# Patient Record
Sex: FEMALE | Race: WHITE
[De-identification: names, ages, dates, MRNs, and addresses within clinical notes are randomized per-mention and may not be internally consistent; named-entity substitution may affect disease eponyms.]

---

## 2019-08-27 ENCOUNTER — HOSPITAL ENCOUNTER (EMERGENCY)
Dept: HOSPITAL 4 - SED | Age: 34
Discharge: HOME | End: 2019-08-27
Payer: OTHER GOVERNMENT

## 2019-08-27 VITALS — SYSTOLIC BLOOD PRESSURE: 120 MMHG

## 2019-08-27 VITALS — BODY MASS INDEX: 23.9 KG/M2 | WEIGHT: 140 LBS | HEIGHT: 64 IN

## 2019-08-27 DIAGNOSIS — R06.00: Primary | ICD-10-CM

## 2019-08-27 DIAGNOSIS — F17.290: ICD-10-CM

## 2019-08-27 DIAGNOSIS — F17.210: ICD-10-CM

## 2019-08-27 PROCEDURE — 99283 EMERGENCY DEPT VISIT LOW MDM: CPT

## 2019-08-27 PROCEDURE — 71045 X-RAY EXAM CHEST 1 VIEW: CPT

## 2019-08-27 PROCEDURE — 94640 AIRWAY INHALATION TREATMENT: CPT

## 2019-08-27 PROCEDURE — 81025 URINE PREGNANCY TEST: CPT

## 2019-08-27 NOTE — NUR
Patient given written and verbal discharge instructions and verbalizes 
understanding.  ER MD discussed with patient the results and treatment 
provided. Patient in stable condition. ID arm band removed. Rx of Prednisone 
and albuterol given. Patient educated on pain management and to follow up with 
PMD. Pain Scale 0/10.Opportunity for questions provided and answered. 
Medication side effect fact sheet provided.

## 2019-08-27 NOTE — NUR
pt arrives with c/o of SOB at rest. Pt reports smoking a pack a day for 10 
years. Lungs sounds are clear upon auscultation.